# Patient Record
Sex: FEMALE | Race: WHITE | Employment: OTHER | URBAN - METROPOLITAN AREA
[De-identification: names, ages, dates, MRNs, and addresses within clinical notes are randomized per-mention and may not be internally consistent; named-entity substitution may affect disease eponyms.]

---

## 2021-11-12 ENCOUNTER — HOSPITAL ENCOUNTER (OUTPATIENT)
Age: 71
Discharge: HOME | End: 2021-11-12
Payer: MEDICARE

## 2021-11-12 LAB
ALBUMIN SERPL-MCNC: 4.1 G/DL (ref 3.5–5.1)
ALP BLD-CCNC: 62 U/L (ref 38–126)
ALT SERPL-CCNC: 24 U/L (ref 11–66)
ANION GAP SERPL CALCULATED.3IONS-SCNC: 11 MEQ/L (ref 8–16)
AST SERPL-CCNC: 26 U/L (ref 5–40)
AVERAGE GLUCOSE: 117 MG/DL (ref 70–126)
BASOPHILS # BLD: 0.8 %
BASOPHILS ABSOLUTE: 0.1 THOU/MM3 (ref 0–0.1)
BILIRUB SERPL-MCNC: 0.4 MG/DL (ref 0.3–1.2)
BUN BLDV-MCNC: 19 MG/DL (ref 7–22)
CALCIUM SERPL-MCNC: 9.4 MG/DL (ref 8.5–10.5)
CHLORIDE BLD-SCNC: 104 MEQ/L (ref 98–111)
CHOLESTEROL, FASTING: 149 MG/DL (ref 100–199)
CO2: 29 MEQ/L (ref 23–33)
CREAT SERPL-MCNC: 0.8 MG/DL (ref 0.4–1.2)
EOSINOPHIL # BLD: 6.8 %
EOSINOPHILS ABSOLUTE: 0.4 THOU/MM3 (ref 0–0.4)
ERYTHROCYTE [DISTWIDTH] IN BLOOD BY AUTOMATED COUNT: 13.8 % (ref 11.5–14.5)
ERYTHROCYTE [DISTWIDTH] IN BLOOD BY AUTOMATED COUNT: 53 FL (ref 35–45)
GFR SERPL CREATININE-BSD FRML MDRD: 70 ML/MIN/1.73M2
GLUCOSE FASTING: 109 MG/DL (ref 70–108)
HBA1C MFR BLD: 5.9 % (ref 4.4–6.4)
HCT VFR BLD CALC: 46.1 % (ref 37–47)
HDLC SERPL-MCNC: 56 MG/DL
HEMOGLOBIN: 14.4 GM/DL (ref 12–16)
HEPATITIS C ANTIBODY: NEGATIVE
IMMATURE GRANS (ABS): 0.02 THOU/MM3 (ref 0–0.07)
IMMATURE GRANULOCYTES: 0.3 %
LDL CHOLESTEROL CALCULATED: 65 MG/DL
LYMPHOCYTES # BLD: 21.2 %
LYMPHOCYTES ABSOLUTE: 1.3 THOU/MM3 (ref 1–4.8)
MCH RBC QN AUTO: 32.6 PG (ref 26–33)
MCHC RBC AUTO-ENTMCNC: 31.2 GM/DL (ref 32.2–35.5)
MCV RBC AUTO: 104.3 FL (ref 81–99)
MONOCYTES # BLD: 9.7 %
MONOCYTES ABSOLUTE: 0.6 THOU/MM3 (ref 0.4–1.3)
NUCLEATED RED BLOOD CELLS: 0 /100 WBC
PLATELET # BLD: 222 THOU/MM3 (ref 130–400)
PMV BLD AUTO: 10.3 FL (ref 9.4–12.4)
POTASSIUM SERPL-SCNC: 4.3 MEQ/L (ref 3.5–5.2)
RBC # BLD: 4.42 MILL/MM3 (ref 4.2–5.4)
SEG NEUTROPHILS: 61.2 %
SEGMENTED NEUTROPHILS ABSOLUTE COUNT: 3.9 THOU/MM3 (ref 1.8–7.7)
SODIUM BLD-SCNC: 144 MEQ/L (ref 135–145)
TOTAL PROTEIN: 6.7 G/DL (ref 6.1–8)
TRIGLYCERIDE, FASTING: 139 MG/DL (ref 0–199)
WBC # BLD: 6.3 THOU/MM3 (ref 4.8–10.8)

## 2021-11-12 PROCEDURE — 36415 COLL VENOUS BLD VENIPUNCTURE: CPT

## 2021-11-12 PROCEDURE — 80053 COMPREHEN METABOLIC PANEL: CPT

## 2021-11-12 PROCEDURE — 83036 HEMOGLOBIN GLYCOSYLATED A1C: CPT

## 2021-11-12 PROCEDURE — 86803 HEPATITIS C AB TEST: CPT

## 2021-11-12 PROCEDURE — 80061 LIPID PANEL: CPT

## 2021-11-12 PROCEDURE — 85025 COMPLETE CBC W/AUTO DIFF WBC: CPT

## 2022-11-29 ENCOUNTER — HOSPITAL ENCOUNTER (OUTPATIENT)
Age: 72
Discharge: HOME | End: 2022-11-29
Payer: MEDICARE

## 2022-11-29 LAB
ALBUMIN SERPL-MCNC: 3.7 G/DL (ref 3.5–5.1)
ALP BLD-CCNC: 58 U/L (ref 38–126)
ALT SERPL-CCNC: 21 U/L (ref 11–66)
ANION GAP SERPL CALCULATED.3IONS-SCNC: 11 MEQ/L (ref 8–16)
AST SERPL-CCNC: 27 U/L (ref 5–40)
AVERAGE GLUCOSE: 99 MG/DL (ref 70–126)
BASOPHILS # BLD: 0.7 %
BASOPHILS ABSOLUTE: 0 THOU/MM3 (ref 0–0.1)
BILIRUB SERPL-MCNC: 0.5 MG/DL (ref 0.3–1.2)
BUN BLDV-MCNC: 19 MG/DL (ref 7–22)
CALCIUM SERPL-MCNC: 9.3 MG/DL (ref 8.5–10.5)
CHLORIDE BLD-SCNC: 102 MEQ/L (ref 98–111)
CHOLESTEROL, TOTAL: 144 MG/DL (ref 100–199)
CO2: 30 MEQ/L (ref 23–33)
CREAT SERPL-MCNC: 0.8 MG/DL (ref 0.4–1.2)
EOSINOPHIL # BLD: 5.4 %
EOSINOPHILS ABSOLUTE: 0.3 THOU/MM3 (ref 0–0.4)
ERYTHROCYTE [DISTWIDTH] IN BLOOD BY AUTOMATED COUNT: 15.5 % (ref 11.5–14.5)
ERYTHROCYTE [DISTWIDTH] IN BLOOD BY AUTOMATED COUNT: 57.1 FL (ref 35–45)
GFR SERPL CREATININE-BSD FRML MDRD: > 60 ML/MIN/1.73M2
GLUCOSE BLD-MCNC: 91 MG/DL (ref 70–108)
HBA1C MFR BLD: 5.3 % (ref 4.4–6.4)
HCT VFR BLD CALC: 42 % (ref 37–47)
HDLC SERPL-MCNC: 55 MG/DL
HEMOGLOBIN: 13.2 GM/DL (ref 12–16)
IMMATURE GRANS (ABS): 0.02 THOU/MM3 (ref 0–0.07)
IMMATURE GRANULOCYTES: 0.3 %
LDL CHOLESTEROL CALCULATED: 72 MG/DL
LYMPHOCYTES # BLD: 17 %
LYMPHOCYTES ABSOLUTE: 1 THOU/MM3 (ref 1–4.8)
MCH RBC QN AUTO: 32 PG (ref 26–33)
MCHC RBC AUTO-ENTMCNC: 31.4 GM/DL (ref 32.2–35.5)
MCV RBC AUTO: 101.9 FL (ref 81–99)
MONOCYTES # BLD: 9.1 %
MONOCYTES ABSOLUTE: 0.5 THOU/MM3 (ref 0.4–1.3)
NUCLEATED RED BLOOD CELLS: 0 /100 WBC
PLATELET # BLD: 232 THOU/MM3 (ref 130–400)
PMV BLD AUTO: 10.3 FL (ref 9.4–12.4)
POTASSIUM SERPL-SCNC: 4.5 MEQ/L (ref 3.5–5.2)
RBC # BLD: 4.12 MILL/MM3 (ref 4.2–5.4)
SEG NEUTROPHILS: 67.5 %
SEGMENTED NEUTROPHILS ABSOLUTE COUNT: 3.8 THOU/MM3 (ref 1.8–7.7)
SODIUM BLD-SCNC: 143 MEQ/L (ref 135–145)
TOTAL PROTEIN: 6.4 G/DL (ref 6.1–8)
TRIGL SERPL-MCNC: 84 MG/DL (ref 0–199)
URIC ACID: 7.9 MG/DL (ref 2.4–5.7)
WBC # BLD: 5.7 THOU/MM3 (ref 4.8–10.8)

## 2022-11-29 PROCEDURE — 80061 LIPID PANEL: CPT

## 2022-11-29 PROCEDURE — 83036 HEMOGLOBIN GLYCOSYLATED A1C: CPT

## 2022-11-29 PROCEDURE — 85025 COMPLETE CBC W/AUTO DIFF WBC: CPT

## 2022-11-29 PROCEDURE — 80053 COMPREHEN METABOLIC PANEL: CPT

## 2022-11-29 PROCEDURE — 84550 ASSAY OF BLOOD/URIC ACID: CPT

## 2022-11-29 PROCEDURE — 36415 COLL VENOUS BLD VENIPUNCTURE: CPT

## 2023-05-15 ENCOUNTER — HOSPITAL ENCOUNTER (OUTPATIENT)
Dept: OCCUPATIONAL THERAPY | Age: 73
Setting detail: THERAPIES SERIES
Discharge: HOME | End: 2023-05-15
Payer: MEDICARE

## 2023-05-15 PROCEDURE — 97165 OT EVAL LOW COMPLEX 30 MIN: CPT

## 2023-05-15 NOTE — PROGRESS NOTES
** PLEASE SIGN, DATE AND TIME CERTIFICATION BELOW AND RETURN TO [facility] OUTPATIENT REHABILITATION (FAX #: [phone]). ATTEST/CO-SIGN IF ACCESSING VIA [employer]. THANK YOU.**    I certify that I have examined the patient below and determined that Physical Medicine and Rehabilitation service is necessary and that I approve the established plan of care for up to 90 days or as specifically noted. Attestation, signature or co-signature of physician indicates approval of certification requirements.    ________________________ ____________ __________  Physician Signature   Date   Time   [address] THERAPY  [x] EVALUATION  [] DAILY NOTE (LAND) [] DAILY NOTE (AQUATIC ) [] PROGRESS NOTE [] DISCHARGE NOTE    [] [address] [facility]   [x] [address]    [] [address] [facility]   [] Inga Donell    Date: 5/15/2023  Patient Name:  Caty Cam  : 1950  MRN: [id number]  CSN: [id number]    Referring Practitioner Kong Sales MD   Diagnosis Other specified postprocedural states [I17.309]    Treatment Diagnosis Left reverse total shoulder replacement, decreased ADLs, decreased ROM, decreased strength, increased pain   Date of Evaluation 5/15/23      Functional Outcome Measure Used UEFS   Functional Outcome Score 280 (5/15/23)       Insurance: Primary: Payor: Ok Head /  /  / ,   Secondary:    Authorization Information: PRE CERTIFICATION REQUIRED: YES, AFTER RENARD David Gomez @ [phone] THERAPY BENEFIT:  NO LIMIT BASED ON MED NECESSITY  AQUATIC THERAPY COVERED: YES  MODALITIES COVERED:  YES   Visit # 1, 1/10 for progress note   Visits Allowed: 1   Recertification Date: 75   Physician Follow-Up: 23   Physician Orders: Follow [facility] protocol   Pertinent History: Patient 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 Reports that it destroys her joints. In , she started having pain in her left shoulder.   Put off

## 2023-05-18 ENCOUNTER — HOSPITAL ENCOUNTER (OUTPATIENT)
Dept: OCCUPATIONAL THERAPY | Age: 73
Setting detail: THERAPIES SERIES
End: 2023-05-18
Payer: MEDICARE

## 2023-05-22 ENCOUNTER — HOSPITAL ENCOUNTER (OUTPATIENT)
Dept: OCCUPATIONAL THERAPY | Age: 73
Setting detail: THERAPIES SERIES
Discharge: HOME | End: 2023-05-22
Payer: MEDICARE

## 2023-05-22 PROCEDURE — 97110 THERAPEUTIC EXERCISES: CPT

## 2023-05-22 NOTE — PROGRESS NOTES
[address] THER[address]  [] EVALUATION  [x] DAILY NOTE (LAND) [] DAILY NOTE (AQUATIC ) [] PROGRESS NOTE [] DISCHARGE NOTE    [] 615 [facility]   [x] [place] [address]    [] [facility]   [] [place]    Date: 2023  Patient Name:  Sinai Mathis  : 1950  MRN: [id number]  CSN: [id number]    Referring Practitioner Joe Mac MD   Diagnosis Other specified postprocedural states [L46.130]    Treatment Diagnosis Left reverse total shoulder replacement, decreased ADLs, decreased ROM, decreased strength, increased pain   Date of Evaluation 5/15/23      Functional Outcome Measure Used UEFS   Functional Outcome Score  (5/15/23)       Insurance: Primary: Payor: Haley Rose /  /  / ,   Secondary:    Authorization Information: RECEIVED AUTH FOR 10 VISITS OF OT FROM 23-23 FOR CPT CODES 98244, [facility] [facility], [address], [address]  And 1 evaluation    PRE CERTIFICATION REQUIRED: YES, AFTER EVAL THRU Gifty Mojica @ [phone]  INSURANCE THERAPY BENEFIT:  NO LIMIT BASED ON MED NECESSITY  AQUATIC THERAPY COVERED: YES  MODALITIES COVERED:  YES   Visit # 2, 2/10 for progress note   Visits Allowed: 1 eval and 10 visits   Recertification Date: 03   Physician Follow-Up: 23   Physician Orders: Follow [facility] protocol   Pertinent History: Patient 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 Reports that it destroys her joints. In , she started having pain in her left shoulder. Put off any intervention due to Covid. In the mean time, she was having hand problems. Developed trigger finger in the left hand. Received injections in the left hand and healed nicely. Reports current trigger finger in the right hand. Underwent left reverse total shoulder surgery on 23. Three weeks post op at the time of the initial evaluation.       has a past medical history of Basal cell carcinoma, Breast Cancer, Hyperlipidemia, Hypertension,

## 2023-05-25 ENCOUNTER — HOSPITAL ENCOUNTER (OUTPATIENT)
Dept: OCCUPATIONAL THERAPY | Age: 73
Setting detail: THERAPIES SERIES
Discharge: HOME | End: 2023-05-25
Payer: MEDICARE

## 2023-05-25 PROCEDURE — 97110 THERAPEUTIC EXERCISES: CPT

## 2023-05-25 NOTE — PROGRESS NOTES
[address]ER[address]  [] EVALUATION  [x] DAILY NOTE (LAND) [] DAILY NOTE (AQUATIC ) [] PROGRESS NOTE [] DISCHARGE NOTE    [] 615 [facility]   [x] [address]    [] [facility]   [] Dulce Monique    Date: 2023  Patient Name:  Karen Arriaza  : 1950  MRN: [id number]  CSN: [id number]    Referring Practitioner Anali Reyes MD   Diagnosis Other specified postprocedural states [R17.300]    Treatment Diagnosis Left reverse total shoulder replacement, decreased ADLs, decreased ROM, decreased strength, increased pain   Date of Evaluation 5/15/23      Functional Outcome Measure Used UEFS   Functional Outcome Score  (5/15/23)       Insurance: Primary: Payor: Elmo Jones /  /  / ,   Secondary:    Authorization Information: RECEIVED AUTH FOR 10 VISITS OF OT FROM 23-23 FOR CPT CODES 26506, [facility], [address], [address]  And 1 evaluation    PRE CERTIFICATION REQUIRED: YES, AFTER EVAL THRU Debbie Young @ [phone]  INSURANCE THERAPY BENEFIT:  NO LIMIT BASED ON MED NECESSITY  AQUATIC THERAPY COVERED: YES  MODALITIES COVERED:  YES   Visit # 3, 3/10 for progress note   Visits Allowed: 1 eval and 10 visits   Recertification Date: 46   Physician Follow-Up: 23   Physician Orders: Follow [facility] protocol   Pertinent History: Patient 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 Reports that it destroys her joints. In , she started having pain in her left shoulder. Put off any intervention due to Covid. In the mean time, she was having hand problems. Developed trigger finger in the left hand. Received injections in the left hand and healed nicely. Reports current trigger finger in the right hand. Underwent left reverse total shoulder surgery on 23. Three weeks post op at the time of the initial evaluation.       has a past medical history of Basal cell carcinoma, Breast Cancer, Hyperlipidemia, Hypertension,

## 2023-05-30 ENCOUNTER — HOSPITAL ENCOUNTER (OUTPATIENT)
Dept: OCCUPATIONAL THERAPY | Age: 73
Setting detail: THERAPIES SERIES
Discharge: HOME | End: 2023-05-30
Payer: MEDICARE

## 2023-05-30 PROCEDURE — 97110 THERAPEUTIC EXERCISES: CPT

## 2023-05-30 NOTE — PROGRESS NOTES
[address] THERAPY  [] EVALUATION  [x] DAILY NOTE (LAND) [] DAILY NOTE (AQUATIC ) [] PROGRESS NOTE [] DISCHARGE NOTE    [] 615 [facility]   [x] [address]    [] [facility]   [] Jaci Hodges    Date: 2023  Patient Name:  Denver Sola  : 1950  MRN: [id number]  CSN: [id number]    Referring Practitioner Lilly Trujillo MD   Diagnosis Other specified postprocedural states [V94.128]    Treatment Diagnosis Left reverse total shoulder replacement, decreased ADLs, decreased ROM, decreased strength, increased pain   Date of Evaluation 5/15/23      Functional Outcome Measure Used UEFS   Functional Outcome Score  (5/15/23)       Insurance: Primary: Payor: Marylene Murrain /  /  / ,   Secondary:    Authorization Information: RECEIVED AUTH FOR 10 VISITS OF OT FROM 23-23 FOR CPT CODES 82340, [address], [address], [address]  And 1 evaluation    PRE CERTIFICATION REQUIRED: YES, AFTER EVAL THRU Brook Baca @ [phone]  INSURANCE THERAPY BENEFIT:  NO LIMIT BASED ON MED NECESSITY  AQUATIC THERAPY COVERED: YES  MODALITIES COVERED:  YES   Visit # 4, 4/10 for progress note   Visits Allowed: 1 eval and 10 visits   Recertification Date: 33   Physician Follow-Up: 23   Physician Orders: Follow [facility] protocol   Pertinent History: Patient 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 Reports that it destroys her joints. In , she started having pain in her left shoulder. Put off any intervention due to Covid. In the mean time, she was having hand problems. Developed trigger finger in the left hand. Received injections in the left hand and healed nicely. Reports current trigger finger in the right hand. Underwent left reverse total shoulder surgery on 23. Three weeks post op at the time of the initial evaluation.       has a past medical history of Basal cell carcinoma, Breast Cancer, Hyperlipidemia, Hypertension,

## 2023-06-01 ENCOUNTER — HOSPITAL ENCOUNTER (OUTPATIENT)
Dept: OCCUPATIONAL THERAPY | Age: 73
Setting detail: THERAPIES SERIES
Discharge: HOME | End: 2023-06-01
Payer: MEDICARE

## 2023-06-01 PROCEDURE — 97110 THERAPEUTIC EXERCISES: CPT

## 2023-06-01 NOTE — PROGRESS NOTES
[address]  [] EVALUATION  [x] DAILY NOTE (LAND) [] DAILY NOTE (AQUATIC ) [] PROGRESS NOTE [] DISCHARGE NOTE    [] 615 [facility]   [x] [address]    [] [facility]   [] Adeola Shoulder    Date: 2023  Patient Name:  Dianne Bowden  : 1950  MRN: [id number]  CSN: [id number]    Referring Practitioner Daryle Dials, MD   Diagnosis Other specified postprocedural states [J98.423]    Treatment Diagnosis Left reverse total shoulder replacement, decreased ADLs, decreased ROM, decreased strength, increased pain   Date of Evaluation 5/15/23      Functional Outcome Measure Used UEFS   Functional Outcome Score  (5/15/23)       Insurance: Primary: Payor: Kyler Adams /  /  / ,   Secondary:    Authorization Information: RECEIVED AUTH FOR 10 VISITS OF OT FROM 23-23 FOR CPT CODES 52536, [address], [address], [address]  And 1 evaluation    PRE CERTIFICATION REQUIRED: YES, AFTER EVAL Diony Velez @ [phone]  INSURANCE THERAPY BENEFIT:  NO LIMIT BASED ON MED NECESSITY  AQUATIC THERAPY COVERED: YES  MODALITIES COVERED:  YES   Visit # 5, 5/10 for progress note   Visits Allowed: 1 eval and 10 visits   Recertification Date: 2/5/15   Physician Follow-Up: 23   Physician Orders: Follow [facility] protocol   Pertinent History: Patient 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 Reports that it destroys her joints. In , she started having pain in her left shoulder. Put off any intervention due to Covid. In the mean time, she was having hand problems. Developed trigger finger in the left hand. Received injections in the left hand and healed nicely. Reports current trigger finger in the right hand. Underwent left reverse total shoulder surgery on 23. Three weeks post op at the time of the initial evaluation.       has a past medical history of Basal cell carcinoma, Breast Cancer, Hyperlipidemia, Hypertension,

## 2023-06-06 ENCOUNTER — HOSPITAL ENCOUNTER (OUTPATIENT)
Dept: OCCUPATIONAL THERAPY | Age: 73
Setting detail: THERAPIES SERIES
Discharge: HOME | End: 2023-06-06
Payer: MEDICARE

## 2023-06-06 PROCEDURE — 97110 THERAPEUTIC EXERCISES: CPT

## 2023-06-06 NOTE — PROGRESS NOTES
[address] THERAPY  [] EVALUATION  [x] DAILY NOTE (LAND) [] DAILY NOTE (AQUATIC ) [] PROGRESS NOTE [] DISCHARGE NOTE    [] 615 [facility]   [x] [address]    [] [facility]   [] [facility]    Date: 2023  Patient Name:  Mark Barrios  : 1950  MRN: [id number]  CSN: [id number]    Referring Practitioner Anayeli Ward MD   Diagnosis Other specified postprocedural states [T83.939]    Treatment Diagnosis Left reverse total shoulder replacement, decreased ADLs, decreased ROM, decreased strength, increased pain   Date of Evaluation 5/15/23      Functional Outcome Measure Used UEFS   Functional Outcome Score  (5/15/23)       Insurance: Primary: Payor: Dora Marie /  /  / ,   Secondary:    Authorization Information: RECEIVED AUTH FOR 10 VISITS OF OT FROM 23-23 FOR CPT CODES 14758, [phone], [address]453, [address]53  And 1 evaluation    PRE CERTIFICATION REQUIRED: YES, AFTER EVAL THRU Fredo Brooks @ [phone]  INSURANCE THERAPY BENEFIT:  NO LIMIT BASED ON MED NECESSITY  AQUATIC THERAPY COVERED: YES  MODALITIES COVERED:  YES   Visit # 6, 10 for progress note   Visits Allowed: 1 eval and 10 visits   Recertification Date: 3/1/57   Physician Follow-Up: 23   Physician Orders: Follow [facility] protocol   Pertinent History: Patient 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 Reports that it destroys her joints. In , she started having pain in her left shoulder. Put off any intervention due to Covid. In the mean time, she was having hand problems. Developed trigger finger in the left hand. Received injections in the left hand and healed nicely. Reports current trigger finger in the right hand. Underwent left reverse total shoulder surgery on 23. Three weeks post op at the time of the initial evaluation.       has a past medical history of Basal cell carcinoma, Breast Cancer, Hyperlipidemia, Hypertension,

## 2023-06-08 ENCOUNTER — HOSPITAL ENCOUNTER (OUTPATIENT)
Dept: OCCUPATIONAL THERAPY | Age: 73
Setting detail: THERAPIES SERIES
Discharge: HOME | End: 2023-06-08
Payer: MEDICARE

## 2023-06-08 PROCEDURE — 97110 THERAPEUTIC EXERCISES: CPT

## 2023-06-08 NOTE — PROGRESS NOTES
to 90 and ER to 30 to assist with eventual use during dressing. Patient will increase left elbow PROM to 10 degrees to increase ease with eventual reaching for dishes into upper cupboards. Patient will report pain no greater than 1/10 with performance of HEP to increase ease with eventual use with housekeeping chores. Patient will be independent with HEP to increase ease and ability to sleep      Long Term Goals:  Time Frame: 12 weeks     Patient will report dressing and bathing with no pain increase. 2.   Patient will demonstrate ability to reach overhead for an object with affected extremity without increased pain. 3.  Patient will report ability to perform light gardening tasks without pain increase. Patient Education:   [x]  HEP/Education Completed: Plan of Care, Goals  Medbridge Access Code for HEP: pendulums, scapular pinches  []  No new Education completed  []  Reviewed Prior HEP      [x]  Patient verbalized and/or demonstrated understanding of education provided. []  Patient unable to verbalize and/or demonstrate understanding of education provided. Will continue education. [x]  Barriers to learning: none    PLAN:  Treatment Recommendations: Strengthening, Range of Motion, Manual Therapy - Soft Tissue Mobilization, Manual Therapy - Joint Manipulation, Home Exercise Program, Patient Education, Safety Education and Training, Self-Care Education and Training, and Modalities    []  Plan of care initiated. Plan to see patient 2 times per week for 12 weeks to address the treatment planned outlined above.   [x]  Continue with current plan of care  []  Modify plan of care as follows:    []  Hold pending physician visit  []  Discharge    Time In 0945   Time Out 1030   Timed Code Minutes: 45 min   Total Treatment Time: 45 min       Electronically Signed by: GAIL Solano, OTR/L 6286

## 2023-06-19 ENCOUNTER — HOSPITAL ENCOUNTER (OUTPATIENT)
Dept: OCCUPATIONAL THERAPY | Age: 73
Setting detail: THERAPIES SERIES
Discharge: HOME | End: 2023-06-19
Payer: MEDICARE

## 2023-06-19 PROCEDURE — 97110 THERAPEUTIC EXERCISES: CPT

## 2023-06-19 NOTE — PROGRESS NOTES
[address]ER[address]  [] EVALUATION  [x] DAILY NOTE (LAND) [] DAILY NOTE (AQUATIC )   [] PROGRESS NOTE [] DISCHARGE NOTE    [] 615 [facility]   [x] [place] [address]    [] [facility]   [] Andrew Marquez    Date: 2023  Patient Name:  Sparkle Clark  : 1950  MRN: [id number]  CSN: [id number]    Referring Practitioner Kristine Sandoval MD   Diagnosis Other specified postprocedural states [I14.231]    Treatment Diagnosis Left reverse total shoulder replacement, decreased ADLs, decreased ROM, decreased strength, increased pain   Date of Evaluation 5/15/23      Functional Outcome Measure Used UEFS   Functional Outcome Score  (5/15/23)       Insurance: Primary: Payor: Belen Beaulieu /  /  / ,   Secondary:    Authorization Information: RECEIVED AUTH FOR 2 VISITS OF OT FROM 23-23 FOR CPT CODES 00659, 58496, 62503, 42337     AUTH# [id number]    RECEIVED AUTH FOR 10 VISITS OF OT FROM 23-23 FOR CPT CODES 62831, 41539, 33193, 99966  And 1 evaluation    PRE CERTIFICATION REQUIRED: YES, AFTER RENARD Janeth Castro @ [phone]  INSURANCE THERAPY BENEFIT:  NO LIMIT BASED ON MED NECESSITY  AQUATIC THERAPY COVERED: YES  MODALITIES COVERED:  YES   Visit # 10, 2/10 for PN; progress note 23   Visits Allowed: 13 visits total - auth date of 3/36   Recertification Date: 0/3/59   Physician Follow-Up: 23   Physician Orders: Follow [facility] protocol; from physician visit on  - allowed any activity except behind the back, limited to 1#   Pertinent History: Patient 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 Reports that it destroys her joints. In , she started having pain in her left shoulder. Put off any intervention due to Covid. In the mean time, she was having hand problems. Developed trigger finger in the left hand. Received injections in the left hand and healed nicely.   Reports current trigger finger in

## 2023-06-22 ENCOUNTER — HOSPITAL ENCOUNTER (OUTPATIENT)
Dept: OCCUPATIONAL THERAPY | Age: 73
Setting detail: THERAPIES SERIES
Discharge: HOME | End: 2023-06-22
Payer: MEDICARE

## 2023-06-22 PROCEDURE — 97110 THERAPEUTIC EXERCISES: CPT

## 2023-06-22 NOTE — PROGRESS NOTES
follow regime for reverse total shoulder replacement, modalities as needed    Activity/Treatment Tolerance:  [x]  Patient tolerated treatment well  []  Patient limited by fatigue  []  Patient limited by pain   []  Patient limited by other medical complications  []  Other:     Assessment: Patient is progressing toward goals slowly. Patient has significant difficulty lifting left arm up off mat table. GOALS:  Patient Goal: return to gardening and crafting     Short Term Goals:  Time Frame: 4 weeks    Patient will increase left shoulder PROM to 120 degrees flexion, 50 degrees ER, 90 degrees abduction to increase ease and ability for dressing. Patient will increase left elbow PROM to 10 degrees to increase ease with eventual reaching for dishes into upper cupboards. Patient will report pain no greater than 1/10 with performance of HEP to increase ease with eventual use with housekeeping chores. Patient will be independent with HEP to increase ease and ability to sleep. Long Term Goals:  Time Frame: 12 weeks     Patient will report dressing and bathing with no pain increase. 2.   Patient will demonstrate ability to reach overhead for an object with affected extremity without increased pain. 3.  Patient will report ability to perform light gardening tasks without pain increase. Patient Education:   []  HEP/Education Completed: .   []  No new Education completed  [x]  Reviewed Prior HEP      [x]  Patient verbalized and/or demonstrated understanding of education provided. []  Patient unable to verbalize and/or demonstrate understanding of education provided. Will continue education. [x]  Barriers to learning: none    PLAN:    []  Plan of care initiated. Plan to see patient 2 times per week for 12 weeks to address the treatment planned outlined above.   [x]  Continue with current plan of care  []  Modify plan of care as follows:    []  Hold pending physician visit  []  Discharge    Time In

## 2023-06-27 ENCOUNTER — HOSPITAL ENCOUNTER (OUTPATIENT)
Dept: OCCUPATIONAL THERAPY | Age: 73
Setting detail: THERAPIES SERIES
Discharge: HOME | End: 2023-06-27
Payer: MEDICARE

## 2023-06-27 PROCEDURE — 97110 THERAPEUTIC EXERCISES: CPT

## 2023-06-29 ENCOUNTER — HOSPITAL ENCOUNTER (OUTPATIENT)
Dept: OCCUPATIONAL THERAPY | Age: 73
Setting detail: THERAPIES SERIES
Discharge: HOME | End: 2023-06-29
Payer: MEDICARE

## 2023-06-29 PROCEDURE — 97110 THERAPEUTIC EXERCISES: CPT

## 2023-07-07 ENCOUNTER — HOSPITAL ENCOUNTER (OUTPATIENT)
Dept: OCCUPATIONAL THERAPY | Age: 73
Setting detail: THERAPIES SERIES
Discharge: HOME | End: 2023-07-07
Payer: MEDICARE

## 2023-07-07 PROCEDURE — 97110 THERAPEUTIC EXERCISES: CPT

## 2023-07-07 NOTE — PROGRESS NOTES
[address] [facility] THERAPY  [] EVALUATION  [] DAILY NOTE (LAND) [] DAILY NOTE (AQUATIC )   [x] PROGRESS NOTE [] DISCHARGE NOTE    [] [address] [facility] [address] [facility] [place]   [x] [address] [facility]    [] [facility]   [] Julia Duty    Date: 2023  Patient Name:  Mikie Pineda  : 1950  MRN: [id number]  CSN: [id number]    Referring Practitioner Guille Strong MD   Diagnosis Other specified postprocedural states [E61.248]    Treatment Diagnosis Left reverse total shoulder replacement, decreased ADLs, decreased ROM, decreased strength, increased pain   Date of Evaluation 5/15/23      Functional Outcome Measure Used UEFS   Functional Outcome Score  (5/15/23)       Insurance: Primary: Payor: Audelia Diaz /  /  / ,   Secondary:    Authorization Information: RECEIVED AUTH FOR 6 OT VISITS FROM23-23 FOR CPT CODES 85395, 45524, 25302, 60035      RECEIVED AUTH FOR 2 VISITS OF OT FROM 23-23 FOR CPT CODES 54857, 78288, 63586, 10629     AUTH# [id number]    RECEIVED AUTH FOR 10 VISITS OF OT FROM 23-23 FOR CPT CODES 84164, 55856, [address]9785, 59960  And 1 evaluation    PRE CERTIFICATION REQUIRED: YES, AFTER RENARD Ha @ [phone]  INSURANCE THERAPY BENEFIT:  NO LIMIT BASED ON MED NECESSITY  AQUATIC THERAPY COVERED: YES  MODALITIES COVERED:  YES   Visit # 14, 6/10 for PN; progress note 23   Visits Allowed: 19 visits total - auth date of    Recertification Date: 82   Physician Follow-Up: 23   Physician Orders: Follow [facility] protocol; from physician visit on  - allowed any activity except behind the back, limited to 1#   Pertinent History: Patient 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 Reports that it destroys her joints. In , she started having pain in her left shoulder. Put off any intervention due to Covid. In the mean time, she was having hand problems.   Developed trigger finger in the left

## 2023-07-10 ENCOUNTER — HOSPITAL ENCOUNTER (OUTPATIENT)
Dept: OCCUPATIONAL THERAPY | Age: 73
Setting detail: THERAPIES SERIES
Discharge: HOME | End: 2023-07-10
Payer: MEDICARE

## 2023-07-10 PROCEDURE — 97110 THERAPEUTIC EXERCISES: CPT

## 2023-07-10 NOTE — PROGRESS NOTES
[address] THERAPY  [] EVALUATION  [] DAILY NOTE (LAND) [] DAILY NOTE (AQUATIC )   [x] PROGRESS NOTE [] DISCHARGE NOTE    [] [address] [place]   [x] [address] [facility]    [] [facility]   [] Danton Sever    Date: 7/10/2023  Patient Name:  Vijaya Bridges  : 1950  MRN: [id number]  CSN: [id number]    Referring Practitioner Radha Ruiz MD   Diagnosis Other specified postprocedural states [M50.638]    Treatment Diagnosis Left reverse total shoulder replacement, decreased ADLs, decreased ROM, decreased strength, increased pain   Date of Evaluation 5/15/23      Functional Outcome Measure Used UEFS   Functional Outcome Score  (5/15/23)       Insurance: Primary: Payor: Soto Espino /  /  / ,   Secondary:    Authorization Information: RECEIVED AUTH FOR 6 OT VISITS FROM23-23 FOR CPT CODES 89807, 41623, 27942, 96957      RECEIVED AUTH FOR 2 VISITS OF OT FROM 23-23 FOR CPT CODES 38336, 49250, 93261, 23242     AUTH# [id number]    RECEIVED AUTH FOR 10 VISITS OF OT FROM 23-23 FOR CPT CODES 61536, 58038, 63465, 65800  And 1 evaluation    PRE CERTIFICATION REQUIRED: YES, AFTER RENARD Moss @ [phone]  INSURANCE THERAPY BENEFIT:  NO LIMIT BASED ON MED NECESSITY  AQUATIC THERAPY COVERED: YES  MODALITIES COVERED:  YES   Visit # 15, 7/10 for PN; progress note 23   Visits Allowed: 19 visits total - auth date of 25   Recertification Date: 7/3/33   Physician Follow-Up: 23   Physician Orders: Follow [facility] protocol; from physician visit on  - allowed any activity except behind the back, limited to 1#   Pertinent History: Patient 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 Reports that it destroys her joints. In , she started having pain in her left shoulder. Put off any intervention due to Covid. In the mean time, she was having hand problems.   Developed trigger finger in the left

## 2023-07-14 ENCOUNTER — HOSPITAL ENCOUNTER (OUTPATIENT)
Dept: OCCUPATIONAL THERAPY | Age: 73
Setting detail: THERAPIES SERIES
Discharge: HOME | End: 2023-07-14
Payer: MEDICARE

## 2023-07-14 PROCEDURE — 97110 THERAPEUTIC EXERCISES: CPT

## 2023-07-14 NOTE — PROGRESS NOTES
[address] [facility] THERAPY  [] EVALUATION  [x] DAILY NOTE (LAND) [] DAILY NOTE (AQUATIC )   [] PROGRESS NOTE [] DISCHARGE NOTE    [] [address] [place]   [x] 44 [facility]    [] [facility]   [] Aung Reyes    Date: 2023  Patient Name:  Eleanor Ward  : 1950  MRN: [id number]  CSN: [id number]    Referring Practitioner Maury Weir MD   Diagnosis Other specified postprocedural states [J25.047]    Treatment Diagnosis Left reverse total shoulder replacement, decreased ADLs, decreased ROM, decreased strength, increased pain   Date of Evaluation 5/15/23      Functional Outcome Measure Used UEFS   Functional Outcome Score  (5/15/23)       Insurance: Primary: Payor: Gilbert Gaona /  /  / ,   Secondary:    Authorization Information: RECEIVED AUTH FOR 6 OT VISITS FROM23-23 FOR CPT CODES 44226, 50512, 30839, 90680      RECEIVED AUTH FOR 2 VISITS OF OT FROM 23-23 FOR CPT CODES 88457, 38085, 45636, 02285     AUTH# [id number]    RECEIVED AUTH FOR 10 VISITS OF OT FROM 23-23 FOR CPT CODES 31237, 29366, 28020, 03883  And 1 evaluation    PRE CERTIFICATION REQUIRED: YES, AFTER RENARD Amaya @ [phone]  INSURANCE THERAPY BENEFIT:  NO LIMIT BASED ON MED NECESSITY  AQUATIC THERAPY COVERED: YES  MODALITIES COVERED:  YES   Visit # 16, 8/10 for PN; progress note 23   Visits Allowed: 19 visits total - auth date of    Recertification Date: 76   Physician Follow-Up: 23   Physician Orders: Follow [facility] protocol; from physician visit on  - allowed any activity except behind the back, limited to 1#   Pertinent History: Patient 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 Reports that it destroys her joints. In , she started having pain in her left shoulder. Put off any intervention due to Covid. In the mean time, she was having hand problems.   Developed trigger finger in the left

## 2023-07-17 ENCOUNTER — HOSPITAL ENCOUNTER (OUTPATIENT)
Dept: OCCUPATIONAL THERAPY | Age: 73
Setting detail: THERAPIES SERIES
Discharge: HOME | End: 2023-07-17
Payer: MEDICARE

## 2023-07-17 PROCEDURE — 97110 THERAPEUTIC EXERCISES: CPT

## 2023-07-17 NOTE — PROGRESS NOTES
[address] [facility] THERAPY  [] EVALUATION  [x] DAILY NOTE (LAND) [] DAILY NOTE (AQUATIC )   [] PROGRESS NOTE [] DISCHARGE NOTE    [] [address] - [place]   [x] 44 [facility]    [] [facility]   [] Madhuri Mojica    Date: 2023  Patient Name:  Anabelle Wakefield  : 1950  MRN: [id number]  CSN: [id number]    Referring Practitioner Yamil Sommer MD   Diagnosis Other specified postprocedural states [Y18.994]    Treatment Diagnosis Left reverse total shoulder replacement, decreased ADLs, decreased ROM, decreased strength, increased pain   Date of Evaluation 5/15/23      Functional Outcome Measure Used UEFS   Functional Outcome Score  (5/15/23)       Insurance: Primary: Payor: Judi Teixeira /  /  / ,   Secondary:    Authorization Information: RECEIVED AUTH FOR 6 OT VISITS FROM23-23 FOR CPT CODES 23478, 14428, 75754, 90318      RECEIVED AUTH FOR 2 VISITS OF OT FROM 23-23 FOR CPT CODES 79196, 55408, 44482, 86098     AUTH# [id number]    RECEIVED AUTH FOR 10 VISITS OF OT FROM 23-23 FOR CPT CODES 89969, 43360, 32998, 42805  And 1 evaluation    PRE CERTIFICATION REQUIRED: YES, AFTER RENARD Landis @ [phone]  INSURANCE THERAPY BENEFIT:  NO LIMIT BASED ON MED NECESSITY  AQUATIC THERAPY COVERED: YES  MODALITIES COVERED:  YES   Visit # 17, /10 for PN; progress note 23   Visits Allowed: 19 visits total - auth date of 97   Recertification Date: 43   Physician Follow-Up: 23   Physician Orders: Follow [facility] protocol; from physician visit on  - allowed any activity except behind the back, limited to 1#   Pertinent History: Patient 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 Reports that it destroys her joints. In , she started having pain in her left shoulder. Put off any intervention due to Covid. In the mean time, she was having hand problems.   Developed trigger finger in the left

## 2023-07-20 ENCOUNTER — HOSPITAL ENCOUNTER (OUTPATIENT)
Dept: OCCUPATIONAL THERAPY | Age: 73
Setting detail: THERAPIES SERIES
Discharge: HOME | End: 2023-07-20
Payer: MEDICARE

## 2023-07-20 PROCEDURE — 97110 THERAPEUTIC EXERCISES: CPT

## 2023-07-20 NOTE — PROGRESS NOTES
[address] THERAPY  [] EVALUATION  [x] DAILY NOTE (LAND) [] DAILY NOTE (AQUATIC )   [] PROGRESS NOTE [] DISCHARGE NOTE    [] [address] - [place]   [x] 44 [facility]    [] [facility]   [] Ilir Snow    Date: 2023  Patient Name:  Ynes Paulino  : 1950  MRN: [id number]  CSN: [id number]    Referring Practitioner Freddy Nevarez MD   Diagnosis Other specified postprocedural states [X46.621]    Treatment Diagnosis Left reverse total shoulder replacement, decreased ADLs, decreased ROM, decreased strength, increased pain   Date of Evaluation 5/15/23      Functional Outcome Measure Used UEFS   Functional Outcome Score  (5/15/23)       Insurance: Primary: Payor: Uzair Pascal /  /  / ,   Secondary:    Authorization Information: RECEIVED AUTH FOR 6 OT VISITS FROM23-23 FOR CPT CODES 27976, 39999, 20947, 31284      RECEIVED AUTH FOR 2 VISITS OF OT FROM 23-23 FOR CPT CODES 49978, 18208, 83883, 29206     AUTH# [id number]    RECEIVED AUTH FOR 10 VISITS OF OT FROM 23-23 FOR CPT CODES 09105, 92848, 61888, 35360  And 1 evaluation    PRE CERTIFICATION REQUIRED: YES, AFTER RENARD Koo @ [phone]  INSURANCE THERAPY BENEFIT:  NO LIMIT BASED ON MED NECESSITY  AQUATIC THERAPY COVERED: YES  MODALITIES COVERED:  YES   Visit # 18,   5/10 for PN     progress note 23   Visits Allowed: 19 visits total - auth date of    Recertification Date: 26   Physician Follow-Up: 23   Physician Orders: Follow [facility] protocol; from physician visit on  - allowed any activity except behind the back, limited to 1#   Pertinent History: Patient 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 Reports that it destroys her joints. In , she started having pain in her left shoulder. Put off any intervention due to Covid. In the mean time, she was having hand problems.   Developed trigger finger in the

## 2023-07-24 ENCOUNTER — HOSPITAL ENCOUNTER (OUTPATIENT)
Dept: OCCUPATIONAL THERAPY | Age: 73
Setting detail: THERAPIES SERIES
Discharge: HOME | End: 2023-07-24
Payer: MEDICARE

## 2023-07-24 PROCEDURE — 97110 THERAPEUTIC EXERCISES: CPT

## 2023-07-24 NOTE — PROGRESS NOTES
[address] [facility] THERAPY  [] EVALUATION  [] DAILY NOTE (LAND) [] DAILY NOTE (AQUATIC )   [x] PROGRESS NOTE [] DISCHARGE NOTE    [] [address] [facility] [address] - [place]   [x] 44 [facility]    [] [facility]   [] Dean Rohan    Date: 2023  Patient Name:  Stephanie Delong  : 1950  MRN: [id number]  CSN: [id number]    Referring Practitioner Shannon Nagel MD   Diagnosis Other specified postprocedural states [U02.722]    Treatment Diagnosis Left reverse total shoulder replacement, decreased ADLs, decreased ROM, decreased strength, increased pain   Date of Evaluation 5/15/23      Functional Outcome Measure Used UEFS   Functional Outcome Score  (5/15/23)       Insurance: Primary: Payor: Doc Law /  /  / ,   Secondary:    Authorization Information: RECEIVED AUTH FOR 6 OT VISITS FROM23-23 FOR CPT CODES 29626, 48627, 99206, 26303      RECEIVED AUTH FOR 2 VISITS OF OT FROM 23-23 FOR CPT CODES 15558, 19398, 20635, 60226     AUTH# [id number]    RECEIVED AUTH FOR 10 VISITS OF OT FROM 23-23 FOR CPT CODES 73185, 04720, 70647, 65888  And 1 evaluation    PRE CERTIFICATION REQUIRED: YES, AFTER RENARD Martin @ [phone]  INSURANCE THERAPY BENEFIT:  NO LIMIT BASED ON MED NECESSITY  AQUATIC THERAPY COVERED: YES  MODALITIES COVERED:  YES   Visit # 19,   6/10 for PN     progress note 23   Visits Allowed: 19 visits total - auth date of    Recertification Date: 96   Physician Follow-Up: 23   Physician Orders: Follow [facility] protocol; from physician visit on  - allowed any activity except behind the back, limited to 1#   Pertinent History: Patient 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 Reports that it destroys her joints. In , she started having pain in her left shoulder. Put off any intervention due to Covid. In the mean time, she was having hand problems.   Developed trigger finger in the

## 2023-07-31 ENCOUNTER — HOSPITAL ENCOUNTER (OUTPATIENT)
Dept: OCCUPATIONAL THERAPY | Age: 73
Setting detail: THERAPIES SERIES
Discharge: HOME | End: 2023-07-31
Payer: MEDICARE

## 2023-07-31 PROCEDURE — 97110 THERAPEUTIC EXERCISES: CPT

## 2023-07-31 NOTE — PROGRESS NOTES
[address] [facility] THERAPY  [] EVALUATION  [x] DAILY NOTE (LAND) [] DAILY NOTE (AQUATIC )   [] PROGRESS NOTE [] DISCHARGE NOTE    [] [address] - [place]   [x] 44 [facility]    [] [facility]   [] Emma Decker    Date: 2023  Patient Name:  Shane Graham  : 1950  MRN: [id number]  CSN: [id number]    Referring Practitioner Johnell Dakin, MD   Diagnosis Other specified postprocedural states [E14.341]    Treatment Diagnosis Left reverse total shoulder replacement, decreased ADLs, decreased ROM, decreased strength, increased pain   Date of Evaluation 5/15/23      Functional Outcome Measure Used UEFS   Functional Outcome Score  (5/15/23)       Insurance: Primary: Payor: Garnett Economy /  /  / ,   Secondary:    Authorization Information: RECEIVED AUTH FOR 6 OT VISITS FROM 23-23 FOR CPT CODES 70307, 59267, 62495, 50777        RECEIVED AUTH FOR 6 OT VISITS FROM23-23 FOR CPT [address] [facility], 54626, 53405, 91948    RECEIVED AUTH FOR 2 VISITS OF OT FROM 23-23 FOR CPT CODES 94519, 06017, 27993, 69732     RECEIVED AUTH FOR 10 VISITS OF OT FROM 23-23 FOR CPT CODES 87877, 77762, 44533, 55013  And 1 evaluation    PRE CERTIFICATION REQUIRED: YES, AFTER RENARD Zapata Gerhard @ [phone]  INSURANCE THERAPY BENEFIT:  NO LIMIT BASED ON MED NECESSITY  AQUATIC THERAPY COVERED: YES  MODALITIES COVERED:  YES   Visit # 23,   6/10 for PN     progress note 23   Visits Allowed: 25 visits total - auth date of    Recertification Date: 63   Physician Follow-Up: 23   Physician Orders: Follow [facility] protocol; from physician visit on  - allowed any activity except behind the back, limited to 1#   Pertinent History: Patient 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 Reports that it destroys her joints. In , she started having pain in her left shoulder. Put off any intervention due to Covid.   In

## 2023-08-22 ENCOUNTER — NURSE TRIAGE (OUTPATIENT)
Dept: OTHER | Facility: CLINIC | Age: 73
End: 2023-08-22

## 2023-08-22 PROBLEM — I10 ESSENTIAL HYPERTENSION, BENIGN: Status: ACTIVE | Noted: 2023-08-22

## 2023-08-22 PROBLEM — M81.0 SENILE OSTEOPOROSIS: Status: ACTIVE | Noted: 2018-06-08

## 2023-08-22 PROBLEM — E66.01 OBESITY, MORBID, BMI 40.0-49.9 (HCC): Status: ACTIVE | Noted: 2020-08-18

## 2023-08-22 PROBLEM — M47.816 LUMBAR FACET ARTHROPATHY: Status: ACTIVE | Noted: 2021-08-09

## 2023-08-22 PROBLEM — H10.13 ALLERGIC CONJUNCTIVITIS OF BOTH EYES: Status: ACTIVE | Noted: 2021-06-14

## 2023-08-22 PROBLEM — H25.13 NUCLEAR SENILE CATARACT OF BOTH EYES: Status: ACTIVE | Noted: 2021-06-14

## 2023-08-22 PROBLEM — J45.909 ASTHMA: Status: ACTIVE | Noted: 2023-08-22

## 2023-08-22 PROBLEM — G57.32 SUPERFICIAL PERONEAL NERVE NEUROPATHY, LEFT: Status: ACTIVE | Noted: 2021-12-22

## 2023-08-22 RX ORDER — POTASSIUM CHLORIDE 1500 MG/1
TABLET, EXTENDED RELEASE ORAL
COMMUNITY
Start: 2023-05-26

## 2023-08-22 RX ORDER — ATORVASTATIN CALCIUM 10 MG/1
TABLET, FILM COATED ORAL
COMMUNITY
Start: 2023-05-26

## 2023-08-22 RX ORDER — HYDROCORTISONE 25 MG/G
CREAM TOPICAL
COMMUNITY
Start: 2023-05-27 | End: 2023-08-23

## 2023-08-22 RX ORDER — PREGABALIN 50 MG/1
CAPSULE ORAL
COMMUNITY
Start: 2023-07-16

## 2023-08-22 SDOH — HEALTH STABILITY: PHYSICAL HEALTH
ON AVERAGE, HOW MANY DAYS PER WEEK DO YOU ENGAGE IN MODERATE TO STRENUOUS EXERCISE (LIKE A BRISK WALK)?: PATIENT DECLINED

## 2023-08-22 ASSESSMENT — ENCOUNTER SYMPTOMS
ABDOMINAL PAIN: 0
SHORTNESS OF BREATH: 0
CONSTIPATION: 0
NAUSEA: 0
VOMITING: 0
DIARRHEA: 0

## 2023-08-22 NOTE — PROGRESS NOTES
Hui Pitt (:  1950) is a 74 y.o. female,Established patient, here for evaluation of the following chief complaint(s):  New Patient (Establish care), Foot Swelling, and Edema      Subjective   SUBJECTIVE/OBJECTIVE:  HPI  BPs have been fine at home  Tolerating medications well  Taking medications every day yes, compliant  Last Cr 0.89  Last K 4.5  Last LDL 72  Last HbA1C 5.3  Additional concerns bilateral foot swelling, worse on the left---has been worse for the last few months (not as mobile since having reverse total shoulder, recent fracture has immobilized her even more)  Swelling is better today since she has been elevating her feet twice a day and trying to take more steps (using cane at home)     Review of Systems   Constitutional:  Negative for activity change, fatigue and unexpected weight change. Respiratory:  Negative for cough, shortness of breath and wheezing. Cardiovascular:  Positive for leg swelling (worse at end of the day, better in the morning, history of L foot surgery in past with swelling since 2018---can't put on compression stockings at this time due to shoulder). Negative for chest pain and palpitations. Gastrointestinal:  Negative for abdominal pain, constipation, diarrhea, nausea and vomiting. Skin:         Increase problems with fungal rash with heat and cold sores due to stress     Neurological:  Negative for dizziness and headaches. Objective   Physical Exam  Constitutional:       Appearance: Normal appearance. Cardiovascular:      Rate and Rhythm: Normal rate and regular rhythm. Heart sounds: No murmur heard. No gallop. Pulmonary:      Effort: Pulmonary effort is normal.      Breath sounds: Normal breath sounds. No wheezing or rhonchi. Abdominal:      General: Abdomen is flat. Bowel sounds are normal. There is no distension. Palpations: Abdomen is soft. Tenderness: There is no abdominal tenderness. There is no guarding.

## 2023-08-22 NOTE — TELEPHONE ENCOUNTER
Location of patient: [place]    Received call from Willis  at [email or web address] with [employer]. T is unestablished, requesting to be established in [place] office with provider, Genia Duane. Subjective: Caller states \"swelling to left foot\" reporting chronic in nature    Current Symptoms: left foot swelling      Onset: 3 weeks ago; worsening    Associated Symptoms: NA    Pain Severity: 0/10; What has been tried: elevation and taking HCTZ as prescribed    LMP: NA Pregnant: NA    Recommended disposition: See PCP within 3 Days    Care advice provided, patient verbalizes understanding; denies any other questions or concerns; instructed to call back for any new or worsening symptoms. Patient/Caller agrees with recommended disposition; writer provided warm transfer to [employer] at [email or web address] for appointment scheduling    Attention Provider: Thank you for allowing me to participate in the care of your patient. The patient was connected to triage in response to information provided to the ECC/PSC. Please do not respond through this encounter as the response is not directed to a shared pool. Reason for Disposition   MILD to MODERATE ankle swelling (e.g., can't move joint normally, can't do usual activities)  (Exceptions: Itchy, localized swelling; swelling is chronic.)    Protocols used:  Ankle Swelling-ADULT-[place]

## 2023-08-23 ENCOUNTER — OFFICE VISIT (OUTPATIENT)
Dept: FAMILY MEDICINE CLINIC | Age: 73
End: 2023-08-23
Payer: MEDICARE

## 2023-08-23 VITALS
HEIGHT: 62 IN | HEART RATE: 97 BPM | TEMPERATURE: 97.5 F | RESPIRATION RATE: 18 BRPM | DIASTOLIC BLOOD PRESSURE: 72 MMHG | OXYGEN SATURATION: 95 % | WEIGHT: 264 LBS | SYSTOLIC BLOOD PRESSURE: 110 MMHG | BODY MASS INDEX: 48.58 KG/M2

## 2023-08-23 DIAGNOSIS — M1A.09X0 IDIOPATHIC CHRONIC GOUT OF MULTIPLE SITES WITHOUT TOPHUS: ICD-10-CM

## 2023-08-23 DIAGNOSIS — R73.9 HYPERGLYCEMIA: ICD-10-CM

## 2023-08-23 DIAGNOSIS — E78.5 HYPERLIPIDEMIA, UNSPECIFIED HYPERLIPIDEMIA TYPE: ICD-10-CM

## 2023-08-23 DIAGNOSIS — M81.0 AGE RELATED OSTEOPOROSIS, UNSPECIFIED PATHOLOGICAL FRACTURE PRESENCE: ICD-10-CM

## 2023-08-23 DIAGNOSIS — I10 ESSENTIAL HYPERTENSION, BENIGN: Primary | ICD-10-CM

## 2023-08-23 DIAGNOSIS — S42.125A NONDISPLACED FRACTURE OF ACROMIAL PROCESS, LEFT SHOULDER, INITIAL ENCOUNTER FOR CLOSED FRACTURE: ICD-10-CM

## 2023-08-23 DIAGNOSIS — Z91.81 AT HIGH RISK FOR FALLS: ICD-10-CM

## 2023-08-23 DIAGNOSIS — J45.909 UNCOMPLICATED ASTHMA, UNSPECIFIED ASTHMA SEVERITY, UNSPECIFIED WHETHER PERSISTENT: ICD-10-CM

## 2023-08-23 DIAGNOSIS — Z86.19 HISTORY OF COLD SORES: ICD-10-CM

## 2023-08-23 DIAGNOSIS — B35.4 TINEA CORPORIS: ICD-10-CM

## 2023-08-23 PROCEDURE — 1123F ACP DISCUSS/DSCN MKR DOCD: CPT | Performed by: FAMILY MEDICINE

## 2023-08-23 PROCEDURE — 99214 OFFICE O/P EST MOD 30 MIN: CPT | Performed by: FAMILY MEDICINE

## 2023-08-23 PROCEDURE — 3078F DIAST BP <80 MM HG: CPT | Performed by: FAMILY MEDICINE

## 2023-08-23 PROCEDURE — 3074F SYST BP LT 130 MM HG: CPT | Performed by: FAMILY MEDICINE

## 2023-08-23 RX ORDER — CLOTRIMAZOLE AND BETAMETHASONE DIPROPIONATE 10; .64 MG/G; MG/G
CREAM TOPICAL 2 TIMES DAILY
Qty: 45 G | Refills: 2 | Status: SHIPPED | OUTPATIENT
Start: 2023-08-23

## 2023-08-23 RX ORDER — FAMCICLOVIR 500 MG/1
500 TABLET ORAL 2 TIMES DAILY
Qty: 40 TABLET | Refills: 1 | Status: SHIPPED | OUTPATIENT
Start: 2023-08-23 | End: 2023-10-02

## 2023-08-23 RX ORDER — TRAMADOL HYDROCHLORIDE 50 MG/1
TABLET ORAL
COMMUNITY
Start: 2023-08-04

## 2023-08-23 SDOH — ECONOMIC STABILITY: HOUSING INSECURITY
IN THE LAST 12 MONTHS, WAS THERE A TIME WHEN YOU DID NOT HAVE A STEADY PLACE TO SLEEP OR SLEPT IN A SHELTER (INCLUDING NOW)?: NO

## 2023-08-23 SDOH — ECONOMIC STABILITY: FOOD INSECURITY: WITHIN THE PAST 12 MONTHS, YOU WORRIED THAT YOUR FOOD WOULD RUN OUT BEFORE YOU GOT MONEY TO BUY MORE.: NEVER TRUE

## 2023-08-23 SDOH — ECONOMIC STABILITY: FOOD INSECURITY: WITHIN THE PAST 12 MONTHS, THE FOOD YOU BOUGHT JUST DIDN'T LAST AND YOU DIDN'T HAVE MONEY TO GET MORE.: NEVER TRUE

## 2023-08-23 SDOH — ECONOMIC STABILITY: INCOME INSECURITY: HOW HARD IS IT FOR YOU TO PAY FOR THE VERY BASICS LIKE FOOD, HOUSING, MEDICAL CARE, AND HEATING?: NOT HARD AT ALL

## 2023-08-23 ASSESSMENT — PATIENT HEALTH QUESTIONNAIRE - PHQ9
2. FEELING DOWN, DEPRESSED OR HOPELESS: 0
SUM OF ALL RESPONSES TO PHQ QUESTIONS 1-9: 0
SUM OF ALL RESPONSES TO PHQ QUESTIONS 1-9: 0
1. LITTLE INTEREST OR PLEASURE IN DOING THINGS: 0
SUM OF ALL RESPONSES TO PHQ QUESTIONS 1-9: 0
SUM OF ALL RESPONSES TO PHQ QUESTIONS 1-9: 0
SUM OF ALL RESPONSES TO PHQ9 QUESTIONS 1 & 2: 0

## 2023-08-23 ASSESSMENT — ENCOUNTER SYMPTOMS
COUGH: 0
WHEEZING: 0

## 2023-11-30 ENCOUNTER — HOSPITAL ENCOUNTER (OUTPATIENT)
Age: 73
Discharge: HOME | End: 2023-11-30
Payer: MEDICARE

## 2023-11-30 DIAGNOSIS — E78.5 HYPERLIPIDEMIA, UNSPECIFIED HYPERLIPIDEMIA TYPE: ICD-10-CM

## 2023-11-30 DIAGNOSIS — M1A.09X0 IDIOPATHIC CHRONIC GOUT OF MULTIPLE SITES WITHOUT TOPHUS: ICD-10-CM

## 2023-11-30 DIAGNOSIS — B35.4 TINEA CORPORIS: ICD-10-CM

## 2023-11-30 DIAGNOSIS — J45.909 UNCOMPLICATED ASTHMA, UNSPECIFIED ASTHMA SEVERITY, UNSPECIFIED WHETHER PERSISTENT: ICD-10-CM

## 2023-11-30 DIAGNOSIS — R73.9 HYPERGLYCEMIA: ICD-10-CM

## 2023-11-30 DIAGNOSIS — I10 ESSENTIAL HYPERTENSION, BENIGN: ICD-10-CM

## 2023-11-30 LAB
ALBUMIN SERPL BCG-MCNC: 3.8 G/DL (ref 3.5–5.1)
ALP SERPL-CCNC: 57 U/L (ref 38–126)
ALT SERPL W/O P-5'-P-CCNC: 24 U/L (ref 11–66)
ANION GAP SERPL CALC-SCNC: 10 MEQ/L (ref 8–16)
AST SERPL-CCNC: 27 U/L (ref 5–40)
BASOPHILS ABSOLUTE: 0 THOU/MM3 (ref 0–0.1)
BASOPHILS NFR BLD AUTO: 0.6 %
BILIRUB SERPL-MCNC: 0.4 MG/DL (ref 0.3–1.2)
BUN SERPL-MCNC: 15 MG/DL (ref 7–22)
CALCIUM SERPL-MCNC: 9.5 MG/DL (ref 8.5–10.5)
CHLORIDE SERPL-SCNC: 104 MEQ/L (ref 98–111)
CHOLEST SERPL-MCNC: 144 MG/DL (ref 100–199)
CO2 SERPL-SCNC: 32 MEQ/L (ref 23–33)
CREAT SERPL-MCNC: 0.9 MG/DL (ref 0.4–1.2)
DEPRECATED MEAN GLUCOSE BLD GHB EST-ACNC: 93 MG/DL (ref 70–126)
DEPRECATED RDW RBC AUTO: 57.6 FL (ref 35–45)
EOSINOPHIL NFR BLD AUTO: 5 %
EOSINOPHILS ABSOLUTE: 0.3 THOU/MM3 (ref 0–0.4)
ERYTHROCYTE [DISTWIDTH] IN BLOOD BY AUTOMATED COUNT: 15.6 % (ref 11.5–14.5)
GFR SERPL CREATININE-BSD FRML MDRD: > 60 ML/MIN/1.73M2
GLUCOSE SERPL-MCNC: 91 MG/DL (ref 70–108)
HBA1C MFR BLD HPLC: 5.1 % (ref 4.4–6.4)
HCT VFR BLD AUTO: 42.5 % (ref 37–47)
HDLC SERPL-MCNC: 66 MG/DL
HGB BLD-MCNC: 14.1 GM/DL (ref 12–16)
IMM GRANULOCYTES # BLD AUTO: 0.04 THOU/MM3 (ref 0–0.07)
IMM GRANULOCYTES NFR BLD AUTO: 0.6 %
LDLC SERPL CALC-MCNC: 56 MG/DL
LYMPHOCYTES ABSOLUTE: 1.9 THOU/MM3 (ref 1–4.8)
LYMPHOCYTES NFR BLD AUTO: 31.1 %
MCH RBC QN AUTO: 33.7 PG (ref 26–33)
MCHC RBC AUTO-ENTMCNC: 33.2 GM/DL (ref 32.2–35.5)
MCV RBC AUTO: 101.4 FL (ref 81–99)
MONOCYTES ABSOLUTE: 0.6 THOU/MM3 (ref 0.4–1.3)
MONOCYTES NFR BLD AUTO: 9.2 %
NEUTROPHILS NFR BLD AUTO: 53.5 %
NRBC BLD AUTO-RTO: 0 /100 WBC
PLATELET # BLD AUTO: 246 THOU/MM3 (ref 130–400)
PMV BLD AUTO: 10.2 FL (ref 9.4–12.4)
POTASSIUM SERPL-SCNC: 3.9 MEQ/L (ref 3.5–5.2)
PROT SERPL-MCNC: 6.9 G/DL (ref 6.1–8)
RBC # BLD AUTO: 4.19 MILL/MM3 (ref 4.2–5.4)
SEGMENTED NEUTROPHILS ABSOLUTE COUNT: 3.3 THOU/MM3 (ref 1.8–7.7)
SODIUM SERPL-SCNC: 146 MEQ/L (ref 135–145)
TRIGL SERPL-MCNC: 112 MG/DL (ref 0–199)
URATE SERPL-MCNC: 5.1 MG/DL (ref 2.4–5.7)
WBC # BLD AUTO: 6.2 THOU/MM3 (ref 4.8–10.8)

## 2023-11-30 PROCEDURE — 84550 ASSAY OF BLOOD/URIC ACID: CPT

## 2023-11-30 PROCEDURE — 36415 COLL VENOUS BLD VENIPUNCTURE: CPT

## 2023-11-30 PROCEDURE — 80053 COMPREHEN METABOLIC PANEL: CPT

## 2023-11-30 PROCEDURE — 83036 HEMOGLOBIN GLYCOSYLATED A1C: CPT

## 2023-11-30 PROCEDURE — 80061 LIPID PANEL: CPT

## 2023-11-30 PROCEDURE — 85025 COMPLETE CBC W/AUTO DIFF WBC: CPT

## 2023-12-15 ENCOUNTER — OFFICE VISIT (OUTPATIENT)
Dept: FAMILY MEDICINE CLINIC | Age: 73
End: 2023-12-15
Payer: MEDICARE

## 2023-12-15 VITALS
TEMPERATURE: 97.6 F | DIASTOLIC BLOOD PRESSURE: 84 MMHG | BODY MASS INDEX: 49.24 KG/M2 | RESPIRATION RATE: 18 BRPM | HEART RATE: 84 BPM | SYSTOLIC BLOOD PRESSURE: 126 MMHG | WEIGHT: 267.6 LBS | HEIGHT: 62 IN | OXYGEN SATURATION: 96 %

## 2023-12-15 DIAGNOSIS — M06.042 RHEUMATOID ARTHRITIS INVOLVING BOTH HANDS WITH NEGATIVE RHEUMATOID FACTOR (HCC): Primary | ICD-10-CM

## 2023-12-15 DIAGNOSIS — G62.9 NEUROPATHY: ICD-10-CM

## 2023-12-15 DIAGNOSIS — E66.01 CLASS 3 SEVERE OBESITY WITH SERIOUS COMORBIDITY AND BODY MASS INDEX (BMI) OF 45.0 TO 49.9 IN ADULT, UNSPECIFIED OBESITY TYPE (HCC): ICD-10-CM

## 2023-12-15 DIAGNOSIS — I10 ESSENTIAL HYPERTENSION, BENIGN: ICD-10-CM

## 2023-12-15 DIAGNOSIS — M06.041 RHEUMATOID ARTHRITIS INVOLVING BOTH HANDS WITH NEGATIVE RHEUMATOID FACTOR (HCC): Primary | ICD-10-CM

## 2023-12-15 PROCEDURE — 1123F ACP DISCUSS/DSCN MKR DOCD: CPT | Performed by: FAMILY MEDICINE

## 2023-12-15 PROCEDURE — 3074F SYST BP LT 130 MM HG: CPT | Performed by: FAMILY MEDICINE

## 2023-12-15 PROCEDURE — 3079F DIAST BP 80-89 MM HG: CPT | Performed by: FAMILY MEDICINE

## 2023-12-15 PROCEDURE — 99214 OFFICE O/P EST MOD 30 MIN: CPT | Performed by: FAMILY MEDICINE

## 2023-12-15 RX ORDER — KETOCONAZOLE 20 MG/ML
SHAMPOO TOPICAL
COMMUNITY
Start: 2023-09-07

## 2023-12-15 RX ORDER — CEFADROXIL 500 MG/1
500 CAPSULE ORAL
COMMUNITY
Start: 2023-09-21

## 2023-12-15 RX ORDER — PREGABALIN 50 MG/1
50 CAPSULE ORAL 2 TIMES DAILY
Qty: 180 CAPSULE | Refills: 3 | Status: SHIPPED | OUTPATIENT
Start: 2023-12-15 | End: 2024-12-15

## 2023-12-15 RX ORDER — PREDNISONE 2.5 MG/1
2.5 TABLET ORAL DAILY PRN
COMMUNITY
Start: 2023-08-30

## 2024-01-02 ENCOUNTER — TELEPHONE (OUTPATIENT)
Dept: FAMILY MEDICINE CLINIC | Age: 74
End: 2024-01-02

## 2024-01-02 DIAGNOSIS — I10 PRIMARY HYPERTENSION: Primary | ICD-10-CM

## 2024-01-02 RX ORDER — LISINOPRIL 10 MG/1
10 TABLET ORAL DAILY
Qty: 90 TABLET | Refills: 3 | Status: SHIPPED | OUTPATIENT
Start: 2024-01-02

## 2024-02-05 PROBLEM — R26.81 UNSTEADY GAIT: Status: ACTIVE | Noted: 2024-02-05

## 2024-02-05 PROBLEM — R26.89 NEED FOR ASSISTANCE DUE TO UNSTEADY GAIT: Status: ACTIVE | Noted: 2024-02-05

## 2024-04-12 SDOH — HEALTH STABILITY: PHYSICAL HEALTH: ON AVERAGE, HOW MANY DAYS PER WEEK DO YOU ENGAGE IN MODERATE TO STRENUOUS EXERCISE (LIKE A BRISK WALK)?: 2 DAYS

## 2024-04-12 ASSESSMENT — LIFESTYLE VARIABLES
HOW OFTEN DO YOU HAVE A DRINK CONTAINING ALCOHOL: 1
HOW MANY STANDARD DRINKS CONTAINING ALCOHOL DO YOU HAVE ON A TYPICAL DAY: PATIENT DOES NOT DRINK
HOW OFTEN DO YOU HAVE A DRINK CONTAINING ALCOHOL: NEVER
HOW MANY STANDARD DRINKS CONTAINING ALCOHOL DO YOU HAVE ON A TYPICAL DAY: 0
HOW OFTEN DO YOU HAVE SIX OR MORE DRINKS ON ONE OCCASION: 1

## 2024-04-12 ASSESSMENT — PATIENT HEALTH QUESTIONNAIRE - PHQ9
SUM OF ALL RESPONSES TO PHQ QUESTIONS 1-9: 0
2. FEELING DOWN, DEPRESSED OR HOPELESS: NOT AT ALL
SUM OF ALL RESPONSES TO PHQ QUESTIONS 1-9: 0
1. LITTLE INTEREST OR PLEASURE IN DOING THINGS: NOT AT ALL
SUM OF ALL RESPONSES TO PHQ QUESTIONS 1-9: 0
SUM OF ALL RESPONSES TO PHQ9 QUESTIONS 1 & 2: 0
SUM OF ALL RESPONSES TO PHQ QUESTIONS 1-9: 0

## 2024-04-15 ENCOUNTER — OFFICE VISIT (OUTPATIENT)
Dept: FAMILY MEDICINE CLINIC | Age: 74
End: 2024-04-15
Payer: MEDICARE

## 2024-04-15 VITALS
BODY MASS INDEX: 50.46 KG/M2 | OXYGEN SATURATION: 97 % | HEART RATE: 84 BPM | DIASTOLIC BLOOD PRESSURE: 82 MMHG | WEIGHT: 274.2 LBS | SYSTOLIC BLOOD PRESSURE: 138 MMHG | RESPIRATION RATE: 16 BRPM | HEIGHT: 62 IN | TEMPERATURE: 97.5 F

## 2024-04-15 DIAGNOSIS — J45.909 UNCOMPLICATED ASTHMA, UNSPECIFIED ASTHMA SEVERITY, UNSPECIFIED WHETHER PERSISTENT: ICD-10-CM

## 2024-04-15 DIAGNOSIS — I10 ESSENTIAL HYPERTENSION, BENIGN: ICD-10-CM

## 2024-04-15 DIAGNOSIS — Z00.00 MEDICARE ANNUAL WELLNESS VISIT, SUBSEQUENT: Primary | ICD-10-CM

## 2024-04-15 DIAGNOSIS — M1A.09X0 IDIOPATHIC CHRONIC GOUT OF MULTIPLE SITES WITHOUT TOPHUS: ICD-10-CM

## 2024-04-15 DIAGNOSIS — E78.5 HYPERLIPIDEMIA, UNSPECIFIED HYPERLIPIDEMIA TYPE: ICD-10-CM

## 2024-04-15 PROBLEM — E66.01 OBESITY, MORBID, BMI 40.0-49.9 (HCC): Status: RESOLVED | Noted: 2020-08-18 | Resolved: 2024-04-15

## 2024-04-15 PROCEDURE — 1123F ACP DISCUSS/DSCN MKR DOCD: CPT | Performed by: FAMILY MEDICINE

## 2024-04-15 PROCEDURE — G0439 PPPS, SUBSEQ VISIT: HCPCS | Performed by: FAMILY MEDICINE

## 2024-04-15 PROCEDURE — 3075F SYST BP GE 130 - 139MM HG: CPT | Performed by: FAMILY MEDICINE

## 2024-04-15 PROCEDURE — 3079F DIAST BP 80-89 MM HG: CPT | Performed by: FAMILY MEDICINE

## 2024-04-15 RX ORDER — COVID-19 ANTIGEN TEST
KIT MISCELLANEOUS
COMMUNITY
Start: 2024-02-14

## 2024-04-15 RX ORDER — POTASSIUM CHLORIDE 1500 MG/1
20 TABLET, EXTENDED RELEASE ORAL 3 TIMES DAILY
Qty: 270 TABLET | Refills: 3 | Status: SHIPPED | OUTPATIENT
Start: 2024-04-15

## 2024-04-15 RX ORDER — HYDROCHLOROTHIAZIDE 25 MG/1
25 TABLET ORAL DAILY
Qty: 90 TABLET | Refills: 3 | Status: SHIPPED | OUTPATIENT
Start: 2024-04-15

## 2024-04-15 RX ORDER — ATORVASTATIN CALCIUM 10 MG/1
10 TABLET, FILM COATED ORAL DAILY
Qty: 90 TABLET | Refills: 3 | Status: SHIPPED | OUTPATIENT
Start: 2024-04-15

## 2024-04-15 RX ORDER — ALLOPURINOL 300 MG/1
300 TABLET ORAL DAILY
Qty: 90 TABLET | Refills: 3 | Status: SHIPPED | OUTPATIENT
Start: 2024-04-15

## 2024-04-15 RX ORDER — ALBUTEROL SULFATE 90 UG/1
2 AEROSOL, METERED RESPIRATORY (INHALATION) EVERY 6 HOURS PRN
Qty: 18 G | Refills: 2 | Status: SHIPPED | OUTPATIENT
Start: 2024-04-15

## 2024-04-15 ASSESSMENT — LIFESTYLE VARIABLES
HOW MANY STANDARD DRINKS CONTAINING ALCOHOL DO YOU HAVE ON A TYPICAL DAY: PATIENT DOES NOT DRINK
HOW OFTEN DO YOU HAVE A DRINK CONTAINING ALCOHOL: NEVER

## 2024-04-15 ASSESSMENT — PATIENT HEALTH QUESTIONNAIRE - PHQ9
SUM OF ALL RESPONSES TO PHQ QUESTIONS 1-9: 0
2. FEELING DOWN, DEPRESSED OR HOPELESS: NOT AT ALL
SUM OF ALL RESPONSES TO PHQ QUESTIONS 1-9: 0
1. LITTLE INTEREST OR PLEASURE IN DOING THINGS: NOT AT ALL
SUM OF ALL RESPONSES TO PHQ9 QUESTIONS 1 & 2: 0

## 2024-04-15 NOTE — PROGRESS NOTES
Medicare Annual Wellness Visit    Pollo Foy is here for Medicare AWV    Assessment & Plan   Medicare annual wellness visit, subsequent  Essential hypertension, benign  -     KLOR-CON M20 20 MEQ extended release tablet; Take 1 tablet by mouth 3 times daily, Disp-270 tablet, R-3, DAWNormal  -     hydroCHLOROthiazide (HYDRODIURIL) 25 MG tablet; Take 1 tablet by mouth daily, Disp-90 tablet, R-3Normal  Uncomplicated asthma, unspecified asthma severity, unspecified whether persistent  -     albuterol sulfate HFA (PROVENTIL;VENTOLIN;PROAIR) 108 (90 Base) MCG/ACT inhaler; Inhale 2 puffs into the lungs every 6 hours as needed for Wheezing, Disp-18 g, R-2Normal  Hyperlipidemia, unspecified hyperlipidemia type  -     atorvastatin (LIPITOR) 10 MG tablet; Take 1 tablet by mouth daily, Disp-90 tablet, R-3Normal  Idiopathic chronic gout of multiple sites without tophus  -     allopurinol (ZYLOPRIM) 300 MG tablet; Take 1 tablet by mouth daily, Disp-90 tablet, R-3Normal  Recommendations for Preventive Services Due: see orders and patient instructions/AVS.  Recommended screening schedule for the next 5-10 years is provided to the patient in written form: see Patient Instructions/AVS.     Return in 6 months (on 10/15/2024).     Subjective   The following acute and/or chronic problems were also addressed today:  RA is doing much better    Patient's complete Health Risk Assessment and screening values have been reviewed and are found in Flowsheets. The following problems were reviewed today and where indicated follow up appointments were made and/or referrals ordered.    Positive Risk Factor Screenings with Interventions:    Fall Risk:  Do you feel unsteady or are you worried about falling? : (!) yes (uses walker)  2 or more falls in past year?: no  Fall with injury in past year?: no     Interventions:    Reviewed medications, home hazards, visual acuity, and co-morbidities that can increase risk for falls  Aware of her

## 2024-04-15 NOTE — PATIENT INSTRUCTIONS
to lose weight. But your doctor can help you make a weight-loss plan that meets your needs.  You don't have to make a lot of big changes at once. A better idea might be to focus on small changes and stick with them. When those changes become habit, you can add a few more changes.  Some people find it helpful to take an exercise or nutrition class. If you have questions, ask your doctor about seeing a registered dietitian or an exercise specialist. You might also think about joining a weight-loss support group.  If you're not ready to make changes right now, try to pick a date in the future. Then make an appointment with your doctor to talk about when and how you'll get started with a plan.  Follow-up care is a key part of your treatment and safety. Be sure to make and go to all appointments, and call your doctor if you are having problems. It's also a good idea to know your test results and keep a list of the medicines you take.  How can you care for yourself at home?  Set realistic goals. Many people expect to lose much more weight than is likely. A weight loss of 5% to 10% of your body weight may be enough to improve your health.  Get family and friends involved to provide support. Talk to them about why you are trying to lose weight, and ask them to help. They can help by participating in exercise and having meals with you, even if they may be eating something different.  Find what works best for you. If you do not have time or do not like to cook, a program that offers meal replacement bars or shakes may be better for you. Or if you like to prepare meals, finding a plan that includes daily menus and recipes may be best.  Ask your doctor about other health professionals who can help you achieve your weight loss goals.  A dietitian can help you make healthy changes in your diet.  An exercise specialist or  can help you develop a safe and effective exercise program.  A counselor or psychiatrist can

## 2024-10-14 SDOH — ECONOMIC STABILITY: TRANSPORTATION INSECURITY
IN THE PAST 12 MONTHS, HAS LACK OF TRANSPORTATION KEPT YOU FROM MEETINGS, WORK, OR FROM GETTING THINGS NEEDED FOR DAILY LIVING?: NO

## 2024-10-14 SDOH — ECONOMIC STABILITY: FOOD INSECURITY: WITHIN THE PAST 12 MONTHS, YOU WORRIED THAT YOUR FOOD WOULD RUN OUT BEFORE YOU GOT MONEY TO BUY MORE.: NEVER TRUE

## 2024-10-14 SDOH — ECONOMIC STABILITY: INCOME INSECURITY: HOW HARD IS IT FOR YOU TO PAY FOR THE VERY BASICS LIKE FOOD, HOUSING, MEDICAL CARE, AND HEATING?: NOT VERY HARD

## 2024-10-14 SDOH — ECONOMIC STABILITY: FOOD INSECURITY: WITHIN THE PAST 12 MONTHS, THE FOOD YOU BOUGHT JUST DIDN'T LAST AND YOU DIDN'T HAVE MONEY TO GET MORE.: NEVER TRUE

## 2024-10-15 ENCOUNTER — OFFICE VISIT (OUTPATIENT)
Dept: FAMILY MEDICINE CLINIC | Age: 74
End: 2024-10-15
Payer: MEDICARE

## 2024-10-15 VITALS
WEIGHT: 282 LBS | BODY MASS INDEX: 51.89 KG/M2 | TEMPERATURE: 96.9 F | SYSTOLIC BLOOD PRESSURE: 138 MMHG | HEART RATE: 103 BPM | RESPIRATION RATE: 16 BRPM | HEIGHT: 62 IN | OXYGEN SATURATION: 99 % | DIASTOLIC BLOOD PRESSURE: 84 MMHG

## 2024-10-15 DIAGNOSIS — R73.9 HYPERGLYCEMIA: ICD-10-CM

## 2024-10-15 DIAGNOSIS — M1A.09X0 IDIOPATHIC CHRONIC GOUT OF MULTIPLE SITES WITHOUT TOPHUS: ICD-10-CM

## 2024-10-15 DIAGNOSIS — I10 PRIMARY HYPERTENSION: ICD-10-CM

## 2024-10-15 DIAGNOSIS — J45.909 UNCOMPLICATED ASTHMA, UNSPECIFIED ASTHMA SEVERITY, UNSPECIFIED WHETHER PERSISTENT: ICD-10-CM

## 2024-10-15 DIAGNOSIS — G62.9 NEUROPATHY: ICD-10-CM

## 2024-10-15 DIAGNOSIS — I10 ESSENTIAL HYPERTENSION, BENIGN: Primary | ICD-10-CM

## 2024-10-15 DIAGNOSIS — E78.5 HYPERLIPIDEMIA, UNSPECIFIED HYPERLIPIDEMIA TYPE: ICD-10-CM

## 2024-10-15 PROCEDURE — 3075F SYST BP GE 130 - 139MM HG: CPT | Performed by: FAMILY MEDICINE

## 2024-10-15 PROCEDURE — 1123F ACP DISCUSS/DSCN MKR DOCD: CPT | Performed by: FAMILY MEDICINE

## 2024-10-15 PROCEDURE — 99214 OFFICE O/P EST MOD 30 MIN: CPT | Performed by: FAMILY MEDICINE

## 2024-10-15 PROCEDURE — 3079F DIAST BP 80-89 MM HG: CPT | Performed by: FAMILY MEDICINE

## 2024-10-15 RX ORDER — METHOTREXATE 25 MG/ML
INJECTION, SOLUTION INTRA-ARTERIAL; INTRAMUSCULAR; INTRAVENOUS
COMMUNITY
Start: 2024-09-14

## 2024-10-15 RX ORDER — PREGABALIN 50 MG/1
50 CAPSULE ORAL 3 TIMES DAILY
Qty: 270 CAPSULE | Refills: 3 | Status: SHIPPED | OUTPATIENT
Start: 2024-10-15 | End: 2025-10-16

## 2024-10-15 RX ORDER — PREDNISONE 2.5 MG/1
2.5 TABLET ORAL DAILY
COMMUNITY
Start: 2024-09-11

## 2024-10-15 RX ORDER — LISINOPRIL 10 MG/1
10 TABLET ORAL DAILY
Qty: 90 TABLET | Refills: 3 | Status: SHIPPED | OUTPATIENT
Start: 2024-10-15

## 2024-10-15 RX ORDER — FAMCICLOVIR 500 MG/1
500 TABLET ORAL 2 TIMES DAILY
COMMUNITY
Start: 2024-08-05

## 2024-10-15 ASSESSMENT — ENCOUNTER SYMPTOMS
COUGH: 0
WHEEZING: 0
SHORTNESS OF BREATH: 0
CONSTIPATION: 0
VOMITING: 0
BACK PAIN: 1
ABDOMINAL PAIN: 0
CHEST TIGHTNESS: 0
DIARRHEA: 0
NAUSEA: 0

## 2024-10-15 NOTE — PROGRESS NOTES
Pollo Foy (:  1950) is a 74 y.o. female,Established patient, here for evaluation of the following chief complaint(s):  Follow-up      Subjective   SUBJECTIVE/OBJECTIVE:  HPI  BPs have been doing well, not walking as much  Tolerating medications well  Taking medications every day yes, compliant  Just made some adjustment to methotrexate  Last Cr 0.78  Last K 3.5  Last LDL 56  Last HbA1C 5.1  Additional concerns occ'l mucus drainage, seeing podiatry to nails and callous, seeing [facility] for contractures of hands (had some injections)   may need ablation or stent at [facility]    Review of Systems   Constitutional:  Positive for fatigue (chronic). Negative for activity change, appetite change, chills, diaphoresis and unexpected weight change.   Respiratory:  Negative for cough, chest tightness (rare issues), shortness of breath and wheezing.    Cardiovascular:  Positive for leg swelling (unchanged). Negative for chest pain and palpitations.   Gastrointestinal:  Negative for abdominal pain, constipation, diarrhea, nausea and vomiting.   Musculoskeletal:  Positive for back pain, gait problem (using wheeled walker with seat) and neck pain.   Neurological:  Negative for dizziness, light-headedness and headaches.          Objective   Physical Exam  Vitals reviewed.   Constitutional:       General: She is not in acute distress.     Appearance: Normal appearance. She is obese. She is not ill-appearing.   Neck:      Vascular: No carotid bruit.   Cardiovascular:      Rate and Rhythm: Normal rate and regular rhythm.      Heart sounds: No murmur heard.     No gallop.   Pulmonary:      Effort: Pulmonary effort is normal.      Breath sounds: Normal breath sounds. No wheezing, rhonchi or rales.   Abdominal:      General: Abdomen is flat. Bowel sounds are normal. There is no distension.      Palpations: Abdomen is soft.      Tenderness: There is no abdominal tenderness. There is no guarding.   Musculoskeletal:      Right

## 2024-11-01 ENCOUNTER — TELEPHONE (OUTPATIENT)
Dept: FAMILY MEDICINE CLINIC | Age: 74
End: 2024-11-01

## 2024-11-01 NOTE — TELEPHONE ENCOUNTER
Left patient a message to call back office. Received medical equipment request for a new walker from [employer] [place] [employer]. We need to know if the patient was the one requesting this or not.

## 2024-11-04 NOTE — TELEPHONE ENCOUNTER
Spoke to patient and she states already got the walker back in 2/2024.    Called [facility] [place] equipment. They checked with there billing department, and the form is from them and needs completed.

## 2024-11-04 NOTE — TELEPHONE ENCOUNTER
Pollo called the office back stating she found the receipt with work order, [employer] account number, date purchased, and delivery method from when she received her walker from [facility]. Pollo is wanting to know if this is needed? She stated she can either fax this over to us or bring it in or upload it on to [employer].

## 2025-04-13 SDOH — ECONOMIC STABILITY: INCOME INSECURITY: IN THE LAST 12 MONTHS, WAS THERE A TIME WHEN YOU WERE NOT ABLE TO PAY THE MORTGAGE OR RENT ON TIME?: NO

## 2025-04-13 SDOH — ECONOMIC STABILITY: FOOD INSECURITY: WITHIN THE PAST 12 MONTHS, YOU WORRIED THAT YOUR FOOD WOULD RUN OUT BEFORE YOU GOT MONEY TO BUY MORE.: NEVER TRUE

## 2025-04-13 SDOH — ECONOMIC STABILITY: FOOD INSECURITY: WITHIN THE PAST 12 MONTHS, THE FOOD YOU BOUGHT JUST DIDN'T LAST AND YOU DIDN'T HAVE MONEY TO GET MORE.: NEVER TRUE

## 2025-04-13 SDOH — ECONOMIC STABILITY: TRANSPORTATION INSECURITY
IN THE PAST 12 MONTHS, HAS THE LACK OF TRANSPORTATION KEPT YOU FROM MEDICAL APPOINTMENTS OR FROM GETTING MEDICATIONS?: NO

## 2025-04-13 ASSESSMENT — LIFESTYLE VARIABLES
HOW OFTEN DO YOU HAVE A DRINK CONTAINING ALCOHOL: NEVER
HOW OFTEN DO YOU HAVE A DRINK CONTAINING ALCOHOL: 1
HOW MANY STANDARD DRINKS CONTAINING ALCOHOL DO YOU HAVE ON A TYPICAL DAY: PATIENT DOES NOT DRINK
HOW MANY STANDARD DRINKS CONTAINING ALCOHOL DO YOU HAVE ON A TYPICAL DAY: 0
HOW OFTEN DO YOU HAVE SIX OR MORE DRINKS ON ONE OCCASION: 1

## 2025-04-13 ASSESSMENT — PATIENT HEALTH QUESTIONNAIRE - PHQ9
SUM OF ALL RESPONSES TO PHQ QUESTIONS 1-9: 1
SUM OF ALL RESPONSES TO PHQ QUESTIONS 1-9: 1
1. LITTLE INTEREST OR PLEASURE IN DOING THINGS: NOT AT ALL
SUM OF ALL RESPONSES TO PHQ QUESTIONS 1-9: 1
2. FEELING DOWN, DEPRESSED OR HOPELESS: SEVERAL DAYS
SUM OF ALL RESPONSES TO PHQ QUESTIONS 1-9: 1

## 2025-04-14 ENCOUNTER — TELEPHONE (OUTPATIENT)
Dept: FAMILY MEDICINE CLINIC | Age: 75
End: 2025-04-14

## 2025-04-14 ENCOUNTER — OFFICE VISIT (OUTPATIENT)
Dept: FAMILY MEDICINE CLINIC | Age: 75
End: 2025-04-14

## 2025-04-14 VITALS
BODY MASS INDEX: 53.44 KG/M2 | OXYGEN SATURATION: 97 % | HEART RATE: 81 BPM | SYSTOLIC BLOOD PRESSURE: 126 MMHG | HEIGHT: 62 IN | DIASTOLIC BLOOD PRESSURE: 82 MMHG | TEMPERATURE: 97.4 F | WEIGHT: 290.4 LBS | RESPIRATION RATE: 18 BRPM

## 2025-04-14 DIAGNOSIS — I10 ESSENTIAL HYPERTENSION, BENIGN: ICD-10-CM

## 2025-04-14 DIAGNOSIS — E78.5 HYPERLIPIDEMIA, UNSPECIFIED HYPERLIPIDEMIA TYPE: ICD-10-CM

## 2025-04-14 DIAGNOSIS — M06.042 RHEUMATOID ARTHRITIS INVOLVING BOTH HANDS WITH NEGATIVE RHEUMATOID FACTOR (HCC): ICD-10-CM

## 2025-04-14 DIAGNOSIS — B35.4 TINEA CORPORIS: ICD-10-CM

## 2025-04-14 DIAGNOSIS — E66.813 OBESITY, CLASS 3: ICD-10-CM

## 2025-04-14 DIAGNOSIS — J45.909 UNCOMPLICATED ASTHMA, UNSPECIFIED ASTHMA SEVERITY, UNSPECIFIED WHETHER PERSISTENT: ICD-10-CM

## 2025-04-14 DIAGNOSIS — M1A.09X0 IDIOPATHIC CHRONIC GOUT OF MULTIPLE SITES WITHOUT TOPHUS: ICD-10-CM

## 2025-04-14 DIAGNOSIS — Z00.00 MEDICARE ANNUAL WELLNESS VISIT, SUBSEQUENT: ICD-10-CM

## 2025-04-14 DIAGNOSIS — K64.4 EXTERNAL HEMORRHOIDS: ICD-10-CM

## 2025-04-14 DIAGNOSIS — I10 ESSENTIAL HYPERTENSION, BENIGN: Primary | ICD-10-CM

## 2025-04-14 DIAGNOSIS — M06.041 RHEUMATOID ARTHRITIS INVOLVING BOTH HANDS WITH NEGATIVE RHEUMATOID FACTOR (HCC): ICD-10-CM

## 2025-04-14 RX ORDER — ALBUTEROL SULFATE 90 UG/1
2 INHALANT RESPIRATORY (INHALATION) EVERY 6 HOURS PRN
Qty: 18 G | Refills: 2 | Status: SHIPPED | OUTPATIENT
Start: 2025-04-14

## 2025-04-14 RX ORDER — CLOTRIMAZOLE AND BETAMETHASONE DIPROPIONATE 10; .64 MG/G; MG/G
CREAM TOPICAL 2 TIMES DAILY
Qty: 45 G | Refills: 2 | Status: SHIPPED | OUTPATIENT
Start: 2025-04-14

## 2025-04-14 RX ORDER — HYDROCHLOROTHIAZIDE 25 MG/1
25 TABLET ORAL DAILY
Qty: 90 TABLET | Refills: 3 | Status: SHIPPED | OUTPATIENT
Start: 2025-04-14

## 2025-04-14 RX ORDER — ALLOPURINOL 300 MG/1
300 TABLET ORAL DAILY
Qty: 90 TABLET | Refills: 3 | Status: SHIPPED | OUTPATIENT
Start: 2025-04-14

## 2025-04-14 RX ORDER — ATORVASTATIN CALCIUM 10 MG/1
10 TABLET, FILM COATED ORAL DAILY
Qty: 90 TABLET | Refills: 3 | Status: SHIPPED | OUTPATIENT
Start: 2025-04-14

## 2025-04-14 RX ORDER — POTASSIUM CHLORIDE 1500 MG/1
20 TABLET, EXTENDED RELEASE ORAL 3 TIMES DAILY
Qty: 270 TABLET | Refills: 3 | Status: SHIPPED | OUTPATIENT
Start: 2025-04-14

## 2025-04-14 RX ORDER — KETOCONAZOLE 20 MG/ML
SHAMPOO, SUSPENSION TOPICAL
COMMUNITY
Start: 2024-10-29

## 2025-04-14 RX ORDER — PREDNISONE 1 MG/1
TABLET ORAL
COMMUNITY
Start: 2025-03-10 | End: 2025-04-14

## 2025-04-14 RX ORDER — POTASSIUM CHLORIDE 1500 MG/1
20 TABLET, EXTENDED RELEASE ORAL 3 TIMES DAILY
Qty: 270 TABLET | Refills: 3 | Status: SHIPPED | OUTPATIENT
Start: 2025-04-14 | End: 2025-04-14 | Stop reason: SDUPTHER

## 2025-04-14 RX ORDER — FAMCICLOVIR 500 MG/1
500 TABLET ORAL 2 TIMES DAILY
Qty: 30 TABLET | Refills: 2 | Status: SHIPPED | OUTPATIENT
Start: 2025-04-14 | End: 2025-04-15 | Stop reason: SDUPTHER

## 2025-04-14 NOTE — TELEPHONE ENCOUNTER
[facility] called regarding the Klor-Con Rx that was sent in today. They want to know if it is CATHIE? They said it was marked as CATHIE. They normally don't carry that in stock.

## 2025-04-14 NOTE — TELEPHONE ENCOUNTER
I called and spoke to [facility]. I let them know Dr. Gomez's response. She said that they do not carry that and that the patient will have to have it sent to another pharmacy.

## 2025-04-14 NOTE — PROGRESS NOTES
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 (HCC)---trying to wean off medications  Hyperlipidemia, unspecified hyperlipidemia type  -     atorvastatin (LIPITOR) 10 MG tablet; Take 1 tablet by mouth daily, Disp-90 tablet, R-3Normal  Medicare annual wellness visit, subsequent  Idiopathic chronic gout of multiple sites without tophus  -     allopurinol (ZYLOPRIM) 300 MG tablet; Take 1 tablet by mouth daily, Disp-90 tablet, R-3Normal  Uncomplicated asthma, unspecified asthma severity, unspecified whether persistent  -     albuterol sulfate HFA (PROVENTIL;VENTOLIN;PROAIR) 108 (90 Base) MCG/ACT inhaler; Inhale 2 puffs into the lungs every 6 hours as needed for Wheezing, Disp-18 g, R-2Normal  Tinea corporis  -     clotrimazole-betamethasone (LOTRISONE) 1-0.05 % cream; Apply topically 2 times daily Apply topically 2 times daily., Topical, 2 TIMES DAILY Starting Mon 4/14/2025, Disp-45 g, R-2, Normal  Obesity, class 3 (E66.813)  Body mass index [BMI] 50.0-59.9, adult (Z68.43)  External hemorrhoids---Last colonoscopy 2018 with Dr Payne, would like to consider hemorrhoid banding     Return in 6 months (on 10/14/2025).     Subjective   The following acute and/or chronic problems were also addressed today:  BPs have been /55-84, rare low levels   Tolerating medications well  Taking medications every day yes, compliant  Last Cr 0.82  Last K 4.4  Last LDL 59  Last HbA1C 5.3  Additional concerns if more than 3 hours of sleep then back pain is terrible, no strength in the left leg---starts to improve in about an hour.  Uses lidocaine patch

## 2025-04-15 RX ORDER — FAMCICLOVIR 500 MG/1
500 TABLET ORAL 2 TIMES DAILY PRN
Qty: 21 TABLET | Refills: 2 | Status: SHIPPED | OUTPATIENT
Start: 2025-04-15

## 2025-06-12 ENCOUNTER — PATIENT MESSAGE (OUTPATIENT)
Dept: FAMILY MEDICINE CLINIC | Age: 75
End: 2025-06-12

## 2025-06-12 DIAGNOSIS — G62.9 NEUROPATHY: ICD-10-CM

## 2025-06-13 RX ORDER — PREGABALIN 50 MG/1
50 CAPSULE ORAL 3 TIMES DAILY
Qty: 270 CAPSULE | Refills: 3 | Status: SHIPPED | OUTPATIENT
Start: 2025-06-13 | End: 2026-06-14

## 2025-06-13 RX ORDER — PREGABALIN 50 MG/1
CAPSULE ORAL
Qty: 270 CAPSULE | Refills: 3 | OUTPATIENT
Start: 2025-06-13